# Patient Record
Sex: FEMALE | Race: BLACK OR AFRICAN AMERICAN | NOT HISPANIC OR LATINO | Employment: OTHER | URBAN - METROPOLITAN AREA
[De-identification: names, ages, dates, MRNs, and addresses within clinical notes are randomized per-mention and may not be internally consistent; named-entity substitution may affect disease eponyms.]

---

## 2018-08-06 ENCOUNTER — OFFICE VISIT (OUTPATIENT)
Dept: PODIATRY | Facility: CLINIC | Age: 83
End: 2018-08-06
Payer: MEDICARE

## 2018-08-06 VITALS
TEMPERATURE: 97 F | DIASTOLIC BLOOD PRESSURE: 97 MMHG | WEIGHT: 152 LBS | SYSTOLIC BLOOD PRESSURE: 206 MMHG | BODY MASS INDEX: 27.97 KG/M2 | HEART RATE: 88 BPM | HEIGHT: 62 IN

## 2018-08-06 DIAGNOSIS — E11.51 TYPE II DIABETES MELLITUS WITH PERIPHERAL CIRCULATORY DISORDER: Primary | ICD-10-CM

## 2018-08-06 DIAGNOSIS — B35.3 TINEA PEDIS OF BOTH FEET: ICD-10-CM

## 2018-08-06 PROCEDURE — 99202 OFFICE O/P NEW SF 15 MIN: CPT | Mod: S$PBB,,, | Performed by: PODIATRIST

## 2018-08-06 PROCEDURE — 99203 OFFICE O/P NEW LOW 30 MIN: CPT | Mod: PBBFAC | Performed by: PODIATRIST

## 2018-08-06 PROCEDURE — 99999 PR PBB SHADOW E&M-NEW PATIENT-LVL III: CPT | Mod: PBBFAC,,, | Performed by: PODIATRIST

## 2018-08-06 RX ORDER — GLIPIZIDE 10 MG/1
TABLET ORAL
COMMUNITY
Start: 2018-07-13

## 2018-08-06 RX ORDER — HYDRALAZINE HYDROCHLORIDE 25 MG/1
TABLET, FILM COATED ORAL
COMMUNITY
Start: 2018-07-13

## 2018-08-06 RX ORDER — TELMISARTAN 80 MG/1
TABLET ORAL
COMMUNITY
Start: 2018-07-13

## 2018-08-06 RX ORDER — SITAGLIPTIN 50 MG/1
TABLET, FILM COATED ORAL
COMMUNITY
Start: 2018-07-13

## 2018-08-06 RX ORDER — CLONIDINE HYDROCHLORIDE 0.1 MG/1
TABLET ORAL
COMMUNITY
Start: 2018-05-01

## 2018-08-06 RX ORDER — FLUTICASONE PROPIONATE 50 MCG
SPRAY, SUSPENSION (ML) NASAL
COMMUNITY
Start: 2018-06-07

## 2018-08-06 RX ORDER — CARVEDILOL PHOSPHATE 80 MG/1
CAPSULE, EXTENDED RELEASE ORAL
COMMUNITY
Start: 2018-05-01

## 2018-08-11 PROBLEM — B35.3 TINEA PEDIS OF BOTH FEET: Status: ACTIVE | Noted: 2018-08-11

## 2018-08-11 PROBLEM — E11.51 TYPE II DIABETES MELLITUS WITH PERIPHERAL CIRCULATORY DISORDER: Status: ACTIVE | Noted: 2018-08-11

## 2018-08-11 NOTE — PROGRESS NOTES
Subjective:       Patient ID: Jamil Gonsalves is a 83 y.o. female.    Chief Complaint: Foot Problem; Diabetes Mellitus; and Diabetic Foot Exam    HPI  Patient presents today for a diabetic evaluation per her primary care provider.  Patient states that she has been a diabetic for approximately 10 years and she is well controlled.  Review of Systems   Musculoskeletal: Positive for arthralgias and joint swelling.   Neurological: Positive for numbness.   All other systems reviewed and are negative.      Objective:      Physical Exam   Constitutional: She appears well-developed and well-nourished.   Cardiovascular:   Pulses:       Dorsalis pedis pulses are 1+ on the right side, and 1+ on the left side.        Posterior tibial pulses are 1+ on the right side, and 1+ on the left side.   Musculoskeletal: She exhibits edema.   Feet:   Right Foot:   Protective Sensation: 4 sites tested. 3 sites sensed.   Skin Integrity: Positive for callus and dry skin.   Left Foot:   Protective Sensation: 4 sites tested. 3 sites sensed.   Skin Integrity: Positive for callus and dry skin.   Neurological: She is alert.   Skin: Skin is warm and dry. Capillary refill takes more than 3 seconds.   Psychiatric: She has a normal mood and affect. Her behavior is normal. Judgment and thought content normal.   Nursing note and vitals reviewed.      On evaluation patient has early signs of diabetic related neuropathy however this is very mild she has loss of light touch sensation in the distal and plantar aspect of both feet however protective sensation is noted to be intact when tested with a 5.07 monofilament.  Patient has excessively dry cracking skin with fissuring noted on the patient's heels bilateral no signs of bacterial involvement are noted patient does display some interdigital maceration consistent with fungal infection in all web spaces.  Assessment:       1. Type II diabetes mellitus with peripheral circulatory disorder    2. Tinea pedis  of both feet        Plan:       Following a complete diabetic evaluation the patient was dispensed diabetic education regarding their feet and how diabetes can affect her feet. Patient's feet were thoroughly evaluated no active signs of infection noted.  Patient was made aware she has no signs of bacterial involvement however there is fungal infection interdigital Betadine was applied to all web spaces patient advised this is very mild but she needs to make sure that she dries her toes between her toes well after bathing to avoid complication I have also made some recommendations as far is a good moisturizing cream the patient can use on her feet her skin is excessively dry flaking cracking especially on the heels this puts her at risk for fissuring breakdown and ultimately ulceration.  Patient displays early signs of peripheral vascular disease and neuropathy related to diabetes I provided the patient educational material regarding this and advised her things that she needs to look for and watch for to prevent diabetic related complications.  Recommended follow-up will be as needed any areas of redness swelling skin breakdown drainage the patient is to contact us immediately.  I have advised the patient I would recommend follow-up at least every 6 months.    This note was created using [employer] voice recognition software that occasionally misinterpreted phrases or words.

## 2018-10-11 ENCOUNTER — TELEPHONE (OUTPATIENT)
Dept: PODIATRY | Facility: CLINIC | Age: 83
End: 2018-10-11

## 2018-10-11 NOTE — TELEPHONE ENCOUNTER
----- Message from Maggy Nagy sent at 10/11/2018  8:37 AM CDT -----  Type: Needs Medical Advice    Caller: Brianna- Referral management dpt with Debbie MCKEON  Best Call Back Number: [phone]  Additional Information: Need the patient's office visit notes from 8/6/18, fax # [phone]